# Patient Record
Sex: FEMALE | Race: WHITE | NOT HISPANIC OR LATINO
[De-identification: names, ages, dates, MRNs, and addresses within clinical notes are randomized per-mention and may not be internally consistent; named-entity substitution may affect disease eponyms.]

---

## 2021-04-19 PROBLEM — Z00.00 ENCOUNTER FOR PREVENTIVE HEALTH EXAMINATION: Status: ACTIVE | Noted: 2021-04-19

## 2021-04-21 ENCOUNTER — APPOINTMENT (OUTPATIENT)
Dept: OBGYN | Facility: CLINIC | Age: 41
End: 2021-04-21
Payer: MEDICAID

## 2021-04-21 VITALS
SYSTOLIC BLOOD PRESSURE: 112 MMHG | TEMPERATURE: 98 F | HEIGHT: 60 IN | WEIGHT: 130 LBS | DIASTOLIC BLOOD PRESSURE: 72 MMHG | BODY MASS INDEX: 25.52 KG/M2

## 2021-04-21 DIAGNOSIS — N95.1 MENOPAUSAL AND FEMALE CLIMACTERIC STATES: ICD-10-CM

## 2021-04-21 DIAGNOSIS — N89.8 OTHER SPECIFIED NONINFLAMMATORY DISORDERS OF VAGINA: ICD-10-CM

## 2021-04-21 DIAGNOSIS — N94.10 UNSPECIFIED DYSPAREUNIA: ICD-10-CM

## 2021-04-21 DIAGNOSIS — R23.2 FLUSHING: ICD-10-CM

## 2021-04-21 DIAGNOSIS — R35.0 FREQUENCY OF MICTURITION: ICD-10-CM

## 2021-04-21 DIAGNOSIS — Z01.411 ENCOUNTER FOR GYNECOLOGICAL EXAMINATION (GENERAL) (ROUTINE) WITH ABNORMAL FINDINGS: ICD-10-CM

## 2021-04-21 DIAGNOSIS — R82.998 OTHER ABNORMAL FINDINGS IN URINE: ICD-10-CM

## 2021-04-21 DIAGNOSIS — N39.0 URINARY TRACT INFECTION, SITE NOT SPECIFIED: ICD-10-CM

## 2021-04-21 DIAGNOSIS — R30.0 DYSURIA: ICD-10-CM

## 2021-04-21 DIAGNOSIS — G47.00 INSOMNIA, UNSPECIFIED: ICD-10-CM

## 2021-04-21 PROCEDURE — 99072 ADDL SUPL MATRL&STAF TM PHE: CPT

## 2021-04-21 PROCEDURE — 99386 PREV VISIT NEW AGE 40-64: CPT

## 2021-04-21 PROCEDURE — 99213 OFFICE O/P EST LOW 20 MIN: CPT | Mod: 25

## 2021-04-21 RX ORDER — CIPROFLOXACIN HYDROCHLORIDE 500 MG/1
500 TABLET, FILM COATED ORAL
Qty: 14 | Refills: 0 | Status: ACTIVE | COMMUNITY
Start: 2021-04-21 | End: 1900-01-01

## 2021-04-21 NOTE — DISCUSSION/SUMMARY
[FreeTextEntry1] : Pap done\par Self breast exam stressed\par Prescribed bilateral screening mammogram\par Prescribed hormone profile\par Issues regarding the possibility of premature menopause discussed with patient including etiologies and treatment options\par Followup yearly or p.r.n.\par

## 2021-04-21 NOTE — HISTORY OF PRESENT ILLNESS
[FreeTextEntry1] : Patient is 40 years old  last menstrual period 2021\par She has a history of 3  sections and bilateral tubal ligation (first  section was for twins)\par She notes regular nuchal periods every month but complains of menopausal symptoms including hot flashes, insomnia, vaginal dryness and discomfort with sexual relations.Patient states that her mother had early menopause at age 41. Patient also complains of urinary symptoms including frequency, pain on urination, and feeling of incomplete emptying of the bladder. Urine dip notes moderate leukocytes and microscopic hematuria.

## 2024-08-12 ENCOUNTER — OFFICE (OUTPATIENT)
Dept: URBAN - METROPOLITAN AREA CLINIC 76 | Facility: CLINIC | Age: 44
Setting detail: OPHTHALMOLOGY
End: 2024-08-12
Payer: COMMERCIAL

## 2024-08-12 DIAGNOSIS — H10.45: ICD-10-CM

## 2024-08-12 DIAGNOSIS — H52.4: ICD-10-CM

## 2024-08-12 DIAGNOSIS — H35.441: ICD-10-CM

## 2024-08-12 DIAGNOSIS — H43.22: ICD-10-CM

## 2024-08-12 PROBLEM — H52.03 HYPEROPIA; BOTH EYES: Status: ACTIVE | Noted: 2024-08-12

## 2024-08-12 PROBLEM — H35.40 PERIPHERAL RETINAL DEGENERATION UNSPECIFIED: Status: ACTIVE | Noted: 2024-08-12

## 2024-08-12 PROCEDURE — 92004 COMPRE OPH EXAM NEW PT 1/>: CPT | Performed by: OPTOMETRIST

## 2024-08-12 PROCEDURE — 92015 DETERMINE REFRACTIVE STATE: CPT | Performed by: OPTOMETRIST

## 2024-08-12 ASSESSMENT — CONFRONTATIONAL VISUAL FIELD TEST (CVF)
OS_FINDINGS: FULL
OD_FINDINGS: FULL